# Patient Record
Sex: FEMALE | Race: WHITE | NOT HISPANIC OR LATINO
[De-identification: names, ages, dates, MRNs, and addresses within clinical notes are randomized per-mention and may not be internally consistent; named-entity substitution may affect disease eponyms.]

---

## 2019-10-08 PROBLEM — Z00.00 ENCOUNTER FOR PREVENTIVE HEALTH EXAMINATION: Status: ACTIVE | Noted: 2019-10-08

## 2022-01-25 ENCOUNTER — APPOINTMENT (OUTPATIENT)
Dept: CARDIOLOGY | Facility: CLINIC | Age: 82
End: 2022-01-25

## 2022-08-28 ENCOUNTER — RESULT REVIEW (OUTPATIENT)
Age: 82
End: 2022-08-28

## 2022-08-30 ENCOUNTER — TRANSCRIPTION ENCOUNTER (OUTPATIENT)
Age: 82
End: 2022-08-30

## 2022-08-30 ENCOUNTER — APPOINTMENT (OUTPATIENT)
Dept: THORACIC SURGERY | Facility: HOSPITAL | Age: 82
End: 2022-08-30

## 2023-11-28 ENCOUNTER — TRANSCRIPTION ENCOUNTER (OUTPATIENT)
Age: 83
End: 2023-11-28

## 2024-03-22 ENCOUNTER — NON-APPOINTMENT (OUTPATIENT)
Age: 84
End: 2024-03-22

## 2024-03-25 DIAGNOSIS — Z87.39 PERSONAL HISTORY OF OTHER DISEASES OF THE MUSCULOSKELETAL SYSTEM AND CONNECTIVE TISSUE: ICD-10-CM

## 2024-03-25 DIAGNOSIS — Z86.79 PERSONAL HISTORY OF OTHER DISEASES OF THE CIRCULATORY SYSTEM: ICD-10-CM

## 2024-03-25 DIAGNOSIS — Z85.828 PERSONAL HISTORY OF OTHER MALIGNANT NEOPLASM OF SKIN: ICD-10-CM

## 2024-03-25 DIAGNOSIS — Z78.9 OTHER SPECIFIED HEALTH STATUS: ICD-10-CM

## 2024-03-25 DIAGNOSIS — Z86.010 PERSONAL HISTORY OF COLONIC POLYPS: ICD-10-CM

## 2024-03-25 DIAGNOSIS — G43.909 MIGRAINE, UNSPECIFIED, NOT INTRACTABLE, W/OUT STATUS MIGRAINOSUS: ICD-10-CM

## 2024-03-26 ENCOUNTER — APPOINTMENT (OUTPATIENT)
Dept: COLORECTAL SURGERY | Facility: CLINIC | Age: 84
End: 2024-03-26
Payer: MEDICARE

## 2024-03-26 VITALS
BODY MASS INDEX: 22.82 KG/M2 | WEIGHT: 142 LBS | DIASTOLIC BLOOD PRESSURE: 66 MMHG | HEIGHT: 66 IN | HEART RATE: 57 BPM | SYSTOLIC BLOOD PRESSURE: 115 MMHG

## 2024-03-26 DIAGNOSIS — K62.3 RECTAL PROLAPSE: ICD-10-CM

## 2024-03-26 PROCEDURE — 99204 OFFICE O/P NEW MOD 45 MIN: CPT

## 2024-03-26 RX ORDER — FLECAINIDE ACETATE 100 MG/1
100 TABLET ORAL
Refills: 0 | Status: ACTIVE | COMMUNITY

## 2024-03-26 RX ORDER — ATORVASTATIN CALCIUM 10 MG/1
10 TABLET, FILM COATED ORAL
Refills: 0 | Status: ACTIVE | COMMUNITY

## 2024-03-26 RX ORDER — RIVAROXABAN 2.5 MG/1
TABLET, FILM COATED ORAL
Refills: 0 | Status: ACTIVE | COMMUNITY

## 2024-03-26 RX ORDER — DILTIAZEM HYDROCHLORIDE 240 MG/1
240 CAPSULE, EXTENDED RELEASE ORAL
Refills: 0 | Status: ACTIVE | COMMUNITY

## 2024-03-26 NOTE — HISTORY OF PRESENT ILLNESS
[FreeTextEntry1] : 83-year-old female pt here with complaints of prolapsed rectum and weak sphincter muscle.  Has a history of Afib, had 2 ablations most recent 8/2022. On Xarelto  Colonoscopy 6/20/22 Dr. Chava Gonzalez one 4mm polyp in ascending colon, polyp was sessile, removed Diverticula were found in ascending colon Biopsies taken of sigmoid due to inflammation. Rectal prolapse Internal hemorrhoids  Pathology: Ascending polyp- adenomatous polyp Sigmoid biopsy- colonic mucosa within normal limits Rectum biopsy- colonic mucosa with surface erosion, ulceration and regenerative changes, consistent with inflammatory polyp  Pt's main concern is the weak anal sphincter muscle Sometimes is incontinent of stool, never has diarrhea Was taking Metamucil but stopped because it didn't seem like she needed it Has several bowel movements day and can happen when she urinates. This is not a new pattern for her. The incontinence has been for the past several weeks. Denies pain with the rectal prolapse Feels it but it is not prolapsed all the time. Feels it go back in when she sits down. Denies bleeding Also has a weak bladder and had pelvic floor therapy for that over a year ago that she didn't find helpful Urogyn did a 12-session treatment where there used electrical stimulus over a year ago and that was not helpful either  Pt walks 5 x a week, 2-2.5 miles and works out with a  twice a week.

## 2024-03-26 NOTE — ASSESSMENT
[FreeTextEntry1] : 83 F, w/ a reducible 1/2 inch rectal prolapse; has intermittent anal incontinence. On exam, weak resting tone and weak active squeeze pressure. She is on xarelto, and not interested in surgery. Plan: Refer to PT for biofeedback, for anal sphincter exercises. See again after 3 months. May need anal manometry. All questions answered.

## 2024-03-26 NOTE — PHYSICAL EXAM
[Abdomen Masses] : No abdominal masses [Abdomen Tenderness] : ~T No ~M abdominal tenderness [No HSM] : no hepatosplenomegaly [No Rash or Lesion] : No rash or lesion [Alert] : alert [Oriented to Person] : oriented to person [Oriented to Place] : oriented to place [Oriented to Time] : oriented to time [Calm] : calm [de-identified] : Soft [de-identified] : External and NONA - lax anal sphinter; low resting tone, weak active squeeze pressure; on straining, approx 1/2 inch prolapse, easily reduced. [de-identified] : Normal [de-identified] : Normal [de-identified] : Normal [de-identified] : Normal [de-identified] : Normal

## 2024-03-28 ENCOUNTER — NON-APPOINTMENT (OUTPATIENT)
Age: 84
End: 2024-03-28

## 2024-06-25 ENCOUNTER — NON-APPOINTMENT (OUTPATIENT)
Age: 84
End: 2024-06-25

## 2024-06-25 ENCOUNTER — APPOINTMENT (OUTPATIENT)
Dept: COLORECTAL SURGERY | Facility: CLINIC | Age: 84
End: 2024-06-25
Payer: MEDICARE

## 2024-06-25 VITALS
WEIGHT: 140 LBS | OXYGEN SATURATION: 99 % | TEMPERATURE: 98.9 F | BODY MASS INDEX: 22.5 KG/M2 | HEIGHT: 66 IN | HEART RATE: 78 BPM | SYSTOLIC BLOOD PRESSURE: 116 MMHG | DIASTOLIC BLOOD PRESSURE: 70 MMHG

## 2024-06-25 PROCEDURE — 99214 OFFICE O/P EST MOD 30 MIN: CPT

## 2024-06-25 RX ORDER — CALCIUM CITRATE/VITAMIN D3 315MG-6.25
TABLET ORAL
Refills: 0 | Status: ACTIVE | COMMUNITY

## 2024-06-25 RX ORDER — ESTRADIOL 10 UG/1
INSERT VAGINAL
Refills: 0 | Status: COMPLETED | COMMUNITY
End: 2024-06-25

## 2024-06-25 RX ORDER — ESZOPICLONE 1 MG/1
1 TABLET, COATED ORAL
Refills: 0 | Status: ACTIVE | COMMUNITY

## 2024-06-25 RX ORDER — SUMATRIPTAN 50 MG/1
50 TABLET, FILM COATED ORAL
Refills: 0 | Status: ACTIVE | COMMUNITY

## 2024-06-25 RX ORDER — MULTIVIT,IRON,MINERALS/LUTEIN
TABLET ORAL
Refills: 0 | Status: ACTIVE | COMMUNITY

## 2024-06-25 NOTE — PHYSICAL EXAM
[Abdomen Masses] : No abdominal masses [Abdomen Tenderness] : ~T No ~M abdominal tenderness [No HSM] : no hepatosplenomegaly [No Rash or Lesion] : No rash or lesion [Alert] : alert [Oriented to Person] : oriented to person [Oriented to Place] : oriented to place [Oriented to Time] : oriented to time [Calm] : calm [de-identified] : Soft [de-identified] : External and NONA - lax anal sphinter; low resting tone, weak active squeeze pressure; on straining, approx 1/2 inch prolapse, easily reduced. [de-identified] : Normal [de-identified] : Normal [de-identified] : Normal [de-identified] : Normal [de-identified] : Normal

## 2024-06-25 NOTE — HISTORY OF PRESENT ILLNESS
[FreeTextEntry1] : 83-year-old female pt last seen 3/26/24 with complaints of prolapsed rectum and weak sphincter muscle Pt was referred for pelvic floor therapy for biofeedback for anal sphincter exercises  Here for follow up Had anal manometry testing 4/24/24- Dr. Simon Impression: Extremely low anal sphincter rest and squeeze pressures, with dyssynergia, increased rectal sensitivity, consistent with Fecal Incontinence Dr. Simon recommended biofeed back as well Dr. Simon is also recommending rectal prolapse repair Pt states she has Afib and does not want to have surgery if she can avoid it Pt just completed Biofeedback at [facility] by Tara Miles.  Pt had approximately 12 sessions.  Was difficult to get an appointment initially but once she got scheduled, she went for 30 minutes sessions twice a week Has some fecal incontinence thinks it's a little better Still has urinary incontinence. Had some improvement with fecal incontinence but none with the urinary

## 2024-06-25 NOTE — ASSESSMENT
[FreeTextEntry1] : 83 F, here for follow up. I reviewed and discussed her anal manometry results (by Dr Simon) with her. She has a reducible 1/2 inch rectal prolapse; has intermittent anal incontinence. Her 'main' problem is urinary incontinence. On exam, weak resting tone and weak active squeeze pressure. She is on xarelto, and is not interested in surgery. Plan: Continue anal sphincter exercises. To see UroGyn Dr Massey for her assessment, especially for urinary incontinence. See again after that. All questions answered.

## 2024-09-18 ENCOUNTER — RESULT REVIEW (OUTPATIENT)
Age: 84
End: 2024-09-18